# Patient Record
Sex: FEMALE | Race: WHITE | URBAN - METROPOLITAN AREA
[De-identification: names, ages, dates, MRNs, and addresses within clinical notes are randomized per-mention and may not be internally consistent; named-entity substitution may affect disease eponyms.]

---

## 2021-08-24 ENCOUNTER — OFFICE VISIT (OUTPATIENT)
Dept: URBAN - METROPOLITAN AREA CLINIC 6 | Facility: CLINIC | Age: 71
End: 2021-08-24
Payer: MEDICARE

## 2021-08-24 DIAGNOSIS — H16.223 BILATERAL KERATOCONJUNCTIVITIS SICCA, NOT SPECIFIED AS SJOGREN'S: ICD-10-CM

## 2021-08-24 DIAGNOSIS — H43.313 VITREOUS MEMBRANES AND STRANDS, BILATERAL: Primary | ICD-10-CM

## 2021-08-24 DIAGNOSIS — E11.9 TYPE 2 DIABETES MELLITUS WITHOUT COMPLICATIONS: ICD-10-CM

## 2021-08-24 DIAGNOSIS — H25.813 COMBINED FORMS OF AGE-RELATED CATARACT, BILATERAL: ICD-10-CM

## 2021-08-24 PROCEDURE — 92014 COMPRE OPH EXAM EST PT 1/>: CPT | Performed by: OPTOMETRIST

## 2021-08-24 RX ORDER — METFORMIN HYDROCHLORIDE 1000 MG/1
TABLET, FILM COATED ORAL
Refills: 0 | Status: ACTIVE
Start: 2021-08-24

## 2021-08-24 RX ORDER — SIMVASTATIN 40 MG/1
40 MG TABLET, FILM COATED ORAL
Refills: 0 | Status: ACTIVE
Start: 2021-08-24

## 2021-08-24 RX ORDER — AMLODIPINE BESYLATE 10 MG/1
10 MG TABLET ORAL
Refills: 0 | Status: ACTIVE
Start: 2021-08-24

## 2021-08-24 ASSESSMENT — INTRAOCULAR PRESSURE
OD: 14
OS: 14

## 2021-08-24 ASSESSMENT — KERATOMETRY
OD: 45.50
OS: 45.50

## 2021-08-24 NOTE — IMPRESSION/PLAN
Impression: Bilateral keratoconjunctivitis sicca, not specified as Sjogren's: O35.370. Plan: Keratoconjunctivitis Sicca both eyes -   Restasis BID OU, start AT gel during the day and lawrence QHS OU. Recheck at next visit.

## 2021-08-24 NOTE — IMPRESSION/PLAN
Impression: Type 2 diabetes mellitus without complications: F21.1. Plan: Diabetes Mellitus Type II without signs of diabetic retinopathy either eye - Discussed the pathophysiology of diabetes and its effect on the eye. Stressed the importance of strong glucose control. Advised of importance of at least annual dilated examinations, and to contact us immediately for any problems or concerns.

## 2021-08-24 NOTE — IMPRESSION/PLAN
Impression: Emergency visit for Vitreous membranes and strands, bilateral: H43.313. Plan: Vitreous floaters/opacities both eyes - reviewed the signs and symptoms of possible retinal detachment (flashes, floaters, change in peripheral vision, etc). Advised to contact us immediately for any of these or other new symptoms.

## 2022-09-06 ENCOUNTER — OFFICE VISIT (OUTPATIENT)
Dept: URBAN - METROPOLITAN AREA CLINIC 6 | Facility: CLINIC | Age: 72
End: 2022-09-06
Payer: MEDICARE

## 2022-09-06 DIAGNOSIS — E11.9 TYPE 2 DIABETES MELLITUS WITHOUT COMPLICATIONS: Primary | ICD-10-CM

## 2022-09-06 DIAGNOSIS — H43.313 VITREOUS MEMBRANES AND STRANDS, BILATERAL: ICD-10-CM

## 2022-09-06 DIAGNOSIS — H25.813 COMBINED FORMS OF AGE-RELATED CATARACT, BILATERAL: ICD-10-CM

## 2022-09-06 DIAGNOSIS — H16.223 BILATERAL KERATOCONJUNCTIVITIS SICCA, NOT SPECIFIED AS SJOGREN'S: ICD-10-CM

## 2022-09-06 PROCEDURE — 92014 COMPRE OPH EXAM EST PT 1/>: CPT | Performed by: OPTOMETRIST

## 2022-09-06 RX ORDER — CYCLOSPORINE 0.5 MG/ML
0.05 % EMULSION OPHTHALMIC
Qty: 180 | Refills: 4 | Status: ACTIVE
Start: 2022-09-06

## 2022-09-06 ASSESSMENT — INTRAOCULAR PRESSURE
OS: 12
OD: 14

## 2022-09-06 NOTE — IMPRESSION/PLAN
Impression: Bilateral keratoconjunctivitis sicca, not specified as Sjogren's: M79.107. Plan: Keratoconjunctivitis Sicca both eyes - Patient reports she ran out of refills for Restasis a month ago- restart Restasis BID OU. Refills sent.

## 2022-09-06 NOTE — IMPRESSION/PLAN
Impression: Type 2 diabetes mellitus without complications: F49.3. Plan: Diabetes Mellitus Type II without signs of diabetic retinopathy either eye - Discussed the pathophysiology of diabetes and its effect on the eye. Stressed the importance of strong glucose control. Advised of importance of at least annual dilated examinations, and to contact us immediately for any problems or concerns.

## 2024-03-05 NOTE — IMPRESSION/PLAN
[facility] AMBULATORY ENCOUNTER  FAMILY PRACTICE VISIT      Chief Complaint   Patient presents with   • UTI     X's 2 weeks     HISTORY OF PRESENT ILLNESS:  Sandra Lee is a 87 year old female who is here for burning with urination    Patient states she has been experiencing a discomfort and burning sensation with urination for the last 2 weeks.  Patient provided urinalysis on 02/29/2024 which was normal other than for mild ketones.  Patient was started on over-the-counter azo.  Patient states since that time it has improved but has not fully resolved.  Patient states she continues to feel a slight discomfort with urination.  Patient is wondering if it could be related to her chronic idiopathic constipation.      She has chronic constipation. She has been managing pretty well. Using MiraLAX.  She has having a bowel movement every 2-3 days.  She is also using Ex-Lax but is having to take him more and more of this.  Discussion had with the patient regarding my recommendation for discontinuing Ex-Lax as this can contribute to a lazy colon.  Discussed my recommendation for resuming her previous prescription for Linzess and using Dulcolax suppositories p.r.n..  Patient is agreeable to this plan.      Patient is agreeable to repeat urinalysis today.    She has not been using the vaginal estradiol as she was getting some mild breast tenderness with this.  Discussion had with the patient regarding my recommendation to resume the vaginal estradiol but to apply to posterior fourchette.     Patient denies other concerns at this time.    Review of Systems:  Constitutional: Negative for fever, chills or night sweats.  HEENT: Negative for eye redness, drainage or change in vision. No rhinorrhea or nasal congestion. No ear pain, tinnitus or drainage. No sore throat or painful swallowing.  Respiratory: Negative for cough, wheezing, or shortness of breath.   Cardiovascular: Negative for chest pain, chest pressure or  Impression: Combined forms of age-related cataract, bilateral: H25.813. Plan: Cataract both eyes : Observe for now without intervention. The patient was advised to contact us if any change or worsening of vision. palpitations.  Gastrointestinal: See HPI. Negative for nausea, vomiting, diarrhea, abdominal pain, blood in the stool, black or tarry stools.  Genitourinary:  See HPI.  Negative for frequency, hematuria or flank pain.  Musculoskeletal: Negative for joint swelling or joint pain.  Neurologic: Negative for headache, change in gait, vision or speech. No weakness, numbness or paresthesias.  Integumentary: Negative for wounds, rashes, ulcers or lesions.   Hematological: Negative for bleeding, bruising or lymphadenopathy.  Psychiatric: Negative for change in affect, anxiety or depression.    Patient Active Problem List   Diagnosis   • History of left bundle branch block   • Lumbosacral spondylosis without myelopathy   • Acquired hypothyroidism   • Sleep disturbance   • History of migraine headaches   • Primary osteoarthritis of left foot   • History of peptic ulcer disease   • History of urinary incontinence   • Arthritis of left knee   • Status post total right knee replacement   • Tubular adenoma of colon   • Herniation of intervertebral disc of lumbar region   • Sciatica of left side   • Spinal stenosis, lumbar region, without neurogenic claudication   • Borderline glaucoma of both eyes with ocular hypertension   • RINA (generalized anxiety disorder)   • History of UTI   • History of rheumatic fever   • Status post coronary angiogram   • Melanosis coli   • Diverticulosis of large intestine without hemorrhage   • Colon polyp   • Redundant colon   • Gastroesophageal reflux disease without esophagitis   • Encounter for long-term (current) use of NSAIDs   • Hiatal hernia   • History of acute pancreatitis   • Recurrent UTI   • Family history of BRCA gene positive   • Family history of breast cancer in first degree relative   • Family history of pancreatic cancer   • History of acute gastritis   • NSAID-associated gastropathy   • Essential hypertension     Past Medical History:   Diagnosis Date   • Arthritis    • Backache,  unspecified 6/28/2013   • Duodenal ulcer    • Dysuria 8/10/2019   • Gastritis    • GERD (gastroesophageal reflux disease)    • Herniated lumbar intervertebral disc 03/2017    With left-sided sciatica   • History of left bundle branch block    • Hypothyroid    • Lumbosacral spondylosis without myelopathy 6/17/2015   • Postmenopausal HRT (hormone replacement therapy) 5/30/2016   • Seasonal allergies      Past Surgical History:   Procedure Laterality Date   • Biopsy of breast, needle core Left     benign 1980s   • Carpal tunnel release      Carpal Tunnel   • Cataract extraction extracapsular w/ intraocular lens implantation      bilateral   • Colonoscopy     • Colonoscopy  12/04/2018    Tubular adenoma, no further colonoscopies suggested at this time   • Colonoscopy w biopsy  02/28/2012   • Colpopexy  01/04/2019   • Dexa bone density axial skeleton  12/16/2008   • Esophagogastroduodenoscopy transoral flex w/bx single or mult  12/20/2011    EGD with Bx   • Joint replacement     • Mammo screening bilateral  09/12/2011   • Removal gallbladder      Cholecystectomy (gallbladder)   • Right and left heart cath  03/08/2005    Normal   • Total knee replacement  04/24/2007    Knee replacement   • Total vaginal hysterectomy  1975    menorrhagia, ovaries intact   • Vaginal delivery      x4     ALLERGIES:   Allergen Reactions   • Allergy HIVES     Scallops.     • Shellfish Allergy   (Food Or Med) HIVES   • Sulfa Antibiotics HIVES     Current Outpatient Medications   Medication Sig Dispense Refill   • bisacodyl (DULCOLAX) 10 MG suppository Place 1 suppository rectally daily as needed for Constipation. 30 each 1   • linaclotide (LINZESS) 72 MCG capsule Take 1 capsule by mouth daily (before breakfast). 30 capsule 5   • polyethylene glycol (MIRALAX) 17 g packet Take 17 g by mouth daily. Stir and dissolve powder in any 4 to 8 ounces of beverage, then drink. 30 each 0   • brimonidine (ALPHAGAN) 0.2 % ophthalmic solution Place 1 drop into  left eye in the morning and 1 drop in the evening. 10 mL 0   • traMADol (ULTRAM) 50 MG tablet Take 1 tablet by mouth 2 times daily as needed for Pain. 60 tablet 5   • estradiol (ESTRACE) 0.1 MG/GM vaginal cream Apply a pea size to the area of the urethra 2 x a week. 42.5 g 5   • lisinopril (ZESTRIL) 10 MG tablet TAKE 1 TABLET EVERY DAY 90 tablet 10   • levothyroxine 75 MCG tablet TAKE 1 TABLET DAILY SUNDAY THRU FRIDAY; AND 1/2 TABLET DAILY ON SATURDAY 85 tablet 1   • omeprazole (PrilOSEC) 20 MG capsule TAKE 1 CAPSULE EVERY DAY 90 capsule 3   • trimethoprim (PROLOPRIM) 100 MG tablet Take 1 tablet by mouth daily. 90 tablet 3   • VITAMIN D PO      • LORazepam (ATIVAN) 0.5 MG tablet Take one tablet at bedtime as needed for sleep 90 tablet 0   • latanoprost (XALATAN) 0.005 % ophthalmic solution Place 0.0005 drops into both eyes daily.     • Multiple Vitamins-Minerals (DAILY MULTIVITAMIN PO)      • Psyllium (METAMUCIL FIBER PO) Take by mouth daily.        No current facility-administered medications for this visit.     Social History     Socioeconomic History   • Marital status: /Civil Union     Spouse name: Not on file   • Number of children: Not on file   • Years of education: Not on file   • Highest education level: Not on file   Occupational History   • Not on file   Tobacco Use   • Smoking status: Never   • Smokeless tobacco: Never   Vaping Use   • Vaping Use: never used   Substance and Sexual Activity   • Alcohol use: Not Currently     Alcohol/week: 2.0 standard drinks of alcohol     Types: 2 Glasses of wine per week     Comment: occ   • Drug use: No   • Sexual activity: Not Currently   Other Topics Concern   •  Service Not Asked   • Blood Transfusions Not Asked   • Caffeine Concern Not Asked   • Occupational Exposure Not Asked   • Hobby Hazards Not Asked   • Sleep Concern Not Asked   • Stress Concern Not Asked   • Weight Concern Not Asked   • Special Diet Not Asked   • Back Care Not Asked   •  Exercise Not Asked   • Bike Helmet Not Asked   • Seat Belt Yes   • Self-Exams Not Asked   Social History Narrative   • Not on file     Social Determinants of Health     Financial Resource Strain: Not on file   Food Insecurity: Not on file   Transportation Needs: Not on file   Physical Activity: Not on file   Stress: Not on file   Social Connections: Not on file   Interpersonal Safety: Not on file (2/24/2021)     Family History   Problem Relation Age of Onset   • Cancer Mother 76        Breast and lung cancer   • Cancer, Breast Mother         late 40s   • Cancer Father 53        Pancreatic cancer   • Cancer, Breast Sister         70s   • Cancer, Prostate Son    • Suicide Son    • Cancer, Breast Maternal Grandmother         70s   • Cancer Maternal Grandmother         breast   • Cancer, Breast Niece         20s     PHYSICAL EXAM:  Visit Vitals  /62   Pulse 69   Temp 97.5 °F (36.4 °C)   Ht 5' 2\" (1.575 m)   Wt 66.7 kg (147 lb)   LMP  (LMP Unknown)   SpO2 99%   BMI 26.89 kg/m²     General Appearance:  Well developed, well nourished, well-appearing female in no acute distress talking in full sentences.  HEENT:  Normocephalic, atraumatic.  PERRLA.  EOMI and full.  Conjunctivae normal.  No scleral icterus.  External ears normal, hearing grossly intact.  Neck:  Supple, trachea midline with no anterior, posterior, supraclavicular or submandibular lymphadenopathy.  No palpable thyromegaly.  No carotid bruit or jugular venous distention.  Respiratory:  Lungs are clear to auscultation bilaterally throughout all lobes.  No wheezing, rhonchi, or crackles.  No stridor.  No increased work of breathing, retractions or accessory muscle use.   Cardiovascular:  Normal rate and rhythm.  Normal S1 and S2.  No S3-S4, murmur, gallop, click or rub.  No clubbing or cyanosis appreciated.  Capillary refill time less than 2 seconds.  Bilateral radial pulses 2+.    Gastrointestinal:  Soft, nontender, nondistended, with no rebound, guarding  or rigidity.  No hepatosplenomegaly or masses.  Bowel sounds 2+.  Genitourinary: Normal external genitalia with normal vulva. No ulcerations or lesions. Normal vagina with no polyps or lesions and with physiologic discharge.  No prolapse appreciated at baseline or with Valsalva. Patient does report tenderness to palpation in the area of the posterior fourchette, and she identifies this as the pain and discomfort she has been experiencing, but there are no ulcers or lesions in his area of the tissue appears normal.  No costovertebral angle tenderness.  Musculoskeletal:  Moves upper and lower extremities, strength 5 out of 5.  No edema, no tenderness, no deformities.  Integument:  Well hydrated with normal skin turgor, no rashes or lesions.   Lymphatic:  No lymphadenopathy noted.   Neurologic:  Alert and oriented x3.  CN II-XII grossly intact.  Normal smooth coordinated gait. No focal deficits noted.   Psychiatric:  Affect normal.  Speech is normal and non-pressured.  Memory, cognition, and judgment appear intact.  Behavior appropriate.    LABORATORY DATA:    Lab Results   Component Value Date    WBC 5.8 01/23/2024    HCT 38.3 01/23/2024    HGB 13.0 01/23/2024     01/23/2024     Lab Results   Component Value Date    GLUCOSE 102 (H) 01/23/2024    SODIUM 132 (L) 01/23/2024    POTASSIUM 4.6 01/23/2024    CHLORIDE 101 01/23/2024    BUN 23 (H) 01/23/2024    CREATININE 0.82 01/23/2024    CALCIUM 8.9 01/23/2024    ALBUMIN 3.8 01/23/2024    AST 16 01/23/2024    ALKPT 63 01/23/2024    GPT 18 01/23/2024    ANIONGAP 9 01/23/2024    BCRAT 24 01/15/2019    GLOB 2.6 01/23/2024    AGR 1.5 01/23/2024     Lab Results   Component Value Date    COL Yellow 03/05/2024    UAPP Clear 03/05/2024    USPG 1.015 03/05/2024    UPH 6.0 03/05/2024    UPROT Negative 03/05/2024    UGLU Negative 03/05/2024    UKET Negative 03/05/2024    UBILI Negative 03/05/2024    URBC Negative 03/05/2024    UNITR Negative 03/05/2024    UROB 0.2 03/05/2024     UWBC Trace (A) 03/05/2024     Lab Results   Component Value Date    CHOLESTEROL 183 01/23/2024    HDL 71 01/23/2024    CALCLDL 92 01/23/2024    TRIGLYCERIDE 102 01/23/2024     Hemoglobin A1C (%)   Date Value   01/23/2024 5.1     No results found for: \"MUTP\", \"URMIC\", \"UCR\", \"MALBCR\"  No results found for: \"T3FREE\"  T4, Free (ng/dL)   Date Value   01/23/2024 1.4   01/17/2023 1.4   10/19/2022 1.4     No results found for: \"PTH\"  MAGNESIUM (mg/dL)   Date Value   01/09/2019 2.3     Magnesium (mg/dL)   Date Value   05/28/2020 2.1     No results found for: \"PHOS\"  No results found for: \"INTAC\"  TSH (mcUnits/mL)   Date Value   01/23/2024 0.905   01/17/2023 0.860   10/19/2022 0.901     No results found for: \"VITD25\"    IMAGING STUDIES:      ED Diagnosis   1. Dysuria  CANCELED: Urinalysis & Reflex Microscopy With Culture If Indicated      2. Chronic idiopathic constipation  bisacodyl (DULCOLAX) 10 MG suppository    linaclotide (LINZESS) 72 MCG capsule    polyethylene glycol (MIRALAX) 17 g packet      3. Vaginal pain              ASSESSMENT/PLAN:    Dysuria and pain in the posterior fourchette of the vagina  - UA as above was clean on 2/29/2024  - Improved with Azo OTC  - Repeat UA today was overall normal other thank trace leukocytes.  Will send for culture.  - Pelvic Exam today was overall normal but had discomfort area of the posterior fourchette which was reproducible with palpation.  The tissue however appeared normal.  I suspect discomfort in this area is direct result of for constipation.  We will treat her constipation.  I would also recommend she resume the vaginal estradiol and apply to the posterior fourchette hopes of improving this tissue discomfort.    Chronic Idiopathic Constipation  - Controlled. BMs every 2-3 days.   - Continue MiraLax  - DC Ex-Lax  - Start Dulcolax Suppository PRN  - Resume Linzess 72 mcg PO Daily, has at home from prior script from GI    Hx of Recurrent UTI  - Controlled  - Continue  Trimethoprim 100 gm PO Daily for PPX  - Resume vaginal estradiol cream 2-3 x a week.  - Consider methenamine hippurate    See orders.   See Patient Instructions section.   No follow-ups on file.    34 minutes spent on visit

## 2024-11-23 NOTE — IMPRESSION/PLAN
Impression: Combined forms of age-related cataract, bilateral: H25.813. Plan: Moderate cataract both eyes : Appears to be visually significant and cataract extraction was offered to the patient, however, patient wishes to wait on surgery. In case they decide to proceed, plan to perform cataract surgery. No